# Patient Record
Sex: FEMALE | Race: WHITE | NOT HISPANIC OR LATINO | Employment: FULL TIME | URBAN - METROPOLITAN AREA
[De-identification: names, ages, dates, MRNs, and addresses within clinical notes are randomized per-mention and may not be internally consistent; named-entity substitution may affect disease eponyms.]

---

## 2018-05-16 ENCOUNTER — TELEPHONE (OUTPATIENT)
Dept: BARIATRICS | Facility: CLINIC | Age: 58
End: 2018-05-16

## 2018-05-17 ENCOUNTER — INITIAL CONSULT (OUTPATIENT)
Dept: BARIATRICS | Facility: CLINIC | Age: 58
End: 2018-05-17
Payer: COMMERCIAL

## 2018-05-17 ENCOUNTER — HOSPITAL ENCOUNTER (OUTPATIENT)
Dept: CARDIOLOGY | Facility: CLINIC | Age: 58
Discharge: HOME | End: 2018-05-17
Payer: COMMERCIAL

## 2018-05-17 ENCOUNTER — HOSPITAL ENCOUNTER (OUTPATIENT)
Dept: RADIOLOGY | Facility: HOSPITAL | Age: 58
Discharge: HOME | End: 2018-05-17
Attending: NURSE PRACTITIONER
Payer: COMMERCIAL

## 2018-05-17 ENCOUNTER — TELEPHONE (OUTPATIENT)
Dept: BARIATRICS | Facility: CLINIC | Age: 58
End: 2018-05-17

## 2018-05-17 ENCOUNTER — CLINICAL SUPPORT (OUTPATIENT)
Dept: BARIATRICS | Facility: CLINIC | Age: 58
End: 2018-05-17
Payer: COMMERCIAL

## 2018-05-17 VITALS
WEIGHT: 293 LBS | SYSTOLIC BLOOD PRESSURE: 122 MMHG | DIASTOLIC BLOOD PRESSURE: 78 MMHG | HEART RATE: 64 BPM | HEIGHT: 62 IN | BODY MASS INDEX: 53.92 KG/M2

## 2018-05-17 VITALS — BODY MASS INDEX: 53.92 KG/M2 | WEIGHT: 293 LBS | HEIGHT: 62 IN

## 2018-05-17 DIAGNOSIS — I10 HYPERTENSION, UNSPECIFIED TYPE: ICD-10-CM

## 2018-05-17 DIAGNOSIS — E66.01 MORBID OBESITY DUE TO EXCESS CALORIES: Primary | ICD-10-CM

## 2018-05-17 DIAGNOSIS — E66.01 MORBID OBESITY WITH BMI OF 60.0-69.9, ADULT: ICD-10-CM

## 2018-05-17 DIAGNOSIS — M19.90 ARTHRITIS: ICD-10-CM

## 2018-05-17 DIAGNOSIS — E66.01 MORBID OBESITY DUE TO EXCESS CALORIES: ICD-10-CM

## 2018-05-17 DIAGNOSIS — E66.9 OBESITY, UNSPECIFIED CLASSIFICATION, UNSPECIFIED OBESITY TYPE, UNSPECIFIED WHETHER SERIOUS COMORBIDITY PRESENT: ICD-10-CM

## 2018-05-17 DIAGNOSIS — R29.818 SUSPECTED SLEEP APNEA: ICD-10-CM

## 2018-05-17 DIAGNOSIS — E03.9 HYPOTHYROIDISM, UNSPECIFIED TYPE: ICD-10-CM

## 2018-05-17 PROCEDURE — 93000 ELECTROCARDIOGRAM COMPLETE: CPT | Mod: S$GLB,,, | Performed by: INTERNAL MEDICINE

## 2018-05-17 PROCEDURE — 99499 UNLISTED E&M SERVICE: CPT | Mod: S$GLB,,, | Performed by: DIETITIAN, REGISTERED

## 2018-05-17 PROCEDURE — 99999 PR PBB SHADOW E&M-EST. PATIENT-LVL II: CPT | Mod: PBBFAC,,, | Performed by: DIETITIAN, REGISTERED

## 2018-05-17 PROCEDURE — 3008F BODY MASS INDEX DOCD: CPT | Mod: CPTII,S$GLB,, | Performed by: NURSE PRACTITIONER

## 2018-05-17 PROCEDURE — 99999 PR PBB SHADOW E&M-EST. PATIENT-LVL V: CPT | Mod: PBBFAC,,, | Performed by: NURSE PRACTITIONER

## 2018-05-17 PROCEDURE — 3074F SYST BP LT 130 MM HG: CPT | Mod: CPTII,S$GLB,, | Performed by: NURSE PRACTITIONER

## 2018-05-17 PROCEDURE — 71046 X-RAY EXAM CHEST 2 VIEWS: CPT | Mod: TC,FY

## 2018-05-17 PROCEDURE — 3078F DIAST BP <80 MM HG: CPT | Mod: CPTII,S$GLB,, | Performed by: NURSE PRACTITIONER

## 2018-05-17 PROCEDURE — 99205 OFFICE O/P NEW HI 60 MIN: CPT | Mod: SA,S$GLB,, | Performed by: NURSE PRACTITIONER

## 2018-05-17 PROCEDURE — 71046 X-RAY EXAM CHEST 2 VIEWS: CPT | Mod: 26,,, | Performed by: RADIOLOGY

## 2018-05-17 RX ORDER — IBUPROFEN 200 MG
400 TABLET ORAL EVERY 6 HOURS PRN
COMMUNITY

## 2018-05-17 RX ORDER — LEVOTHYROXINE SODIUM 100 UG/1
TABLET ORAL
Refills: 3 | COMMUNITY
Start: 2018-03-02 | End: 2018-05-17

## 2018-05-17 RX ORDER — LEVOTHYROXINE SODIUM 125 UG/1
TABLET ORAL
Refills: 3 | COMMUNITY
Start: 2018-03-14

## 2018-05-17 RX ORDER — LOSARTAN POTASSIUM 50 MG/1
TABLET ORAL
Refills: 3 | COMMUNITY
Start: 2018-03-02 | End: 2018-05-17

## 2018-05-17 RX ORDER — TRIAMTERENE AND HYDROCHLOROTHIAZIDE 37.5; 25 MG/1; MG/1
CAPSULE ORAL
Refills: 3 | COMMUNITY
Start: 2018-03-23

## 2018-05-17 RX ORDER — ASPIRIN 81 MG/1
81 TABLET ORAL DAILY
COMMUNITY

## 2018-05-17 NOTE — TELEPHONE ENCOUNTER
----- Message from Jorge Laboy sent at 5/17/2018  3:49 PM CDT -----  Slim Story said she was scheduled for sleep study for tomorrow. Pt is asking to reschedule for two week due to her  not being able to go with her. Please call pt regarding this at [phone]

## 2018-05-17 NOTE — TELEPHONE ENCOUNTER
Patient called to reschedule appointment. Patient understands date, time and location of future appointments.

## 2018-05-17 NOTE — PATIENT INSTRUCTIONS
1200- 1500 calorie Sample meal plan  80-120g protein per day.   Protein drinks and bars: 0-4 grams sugar  Drink lots of water throughout the day and exercise!  MENU # 1  Breakfast: 2 eggs, 1 turkey sausage kaylen, 1 apple  Snack: Atkins bar  Lunch: 2 roll-ups (sliced turkey, low-fat sliced cheese, mustard), 12 baby carrots dipped in 1 Tbsp natural peanut butter  Mid-Day Snack: ¼ cup unsalted almonds, ½ cup fruit  Dinner: 1 chicken thigh simmered in tomato sauce + 2 Tbsp mozzarella cheese, ½ cup black beans, 1/2 cup steamed carrots  Evening Snack: 1/2 cup grapes with 4 cubes low-fat cheddar cheese   ___________________________________________________  MENU # 2  Breakfast: 200 calorie protein drink  Mid-morning snack : ¼ cup unsalted nuts, medium banana  Lunch: 3oz tuna or chicken salad made with 2 tbsp light gamboa, over salad with tomatoes and cucumbers.   Snack: low-fat cheese stick  Dinner: 3oz hamburger kaylen, slice of low-fat cheese, 1 cup boiled yellow squash and zucchini.   Snack: 6oz light yogurt  _______________________________________________________  Menu #3  Breakfast: 6oz plain Greek yogurt + fruit (½ banana, ½ cup fruit - pineapple, blueberries, strawberries, peach), may add Splenda to destiny.  Lunch: Grilled  chicken breast w/ slice low-fat pepper doug cheese, 1/2 cup grilled/baked asparagus and small salad with Salad Spritzer.    Mid-Day snack: 200 calorie protein drink   Dinner: 4oz Grilled fish, ½ cup white beans, ½ cup cooked spinach  Evening Snack: fudgsicle no-sugar-added    Menu # 4  Breakfast: 1 scoop vanilla protein powder + 4oz skim milk + 4oz coffee   Snack: Pure protein bar  Lunch: 2 Lettuce tacos: 3oz seasoned ground turkey wrapped in a Kirby lettuce leaves with 1 Tbsp shredded cheese and dollop low-fat sour cream  Snack: ½ cup cottage cheese, ½ cup pineapple chunks  Dinner: Shrimp omelet: 2 eggs, ½ cup shrimp, green onions, and shredded  cheese  ______________________________________________________  Menu #5  Breakfast: 1 cup low-fat cottage cheese, ½ cup peaches (no sugar added)  Snack: 1 apple, 4 cubes cheese  Lunch: 3oz baked pork chop, 1 cup okra and tomato stew  Snack: 1/2 cup black beans + salsa + dollop light sour cream  Dinner: Caprese chicken salad: 3 oz chicken breast, 1oz fresh mozzarella, sliced tomato, 1 Tbsp olive oil, basil  Snack: sugar-free popsicle    Menu #6  Breakfast: ½ cup part-skim ricotta cheese, 2 Tbsp sugar-free strawberry preserves, sprinkle of slivered almonds  Snack: 1 orange  Lunch: 3 oz canned chicken, 1oz shredded cheddar cheese, ½  cup black beans, 2 Tbsp salsa  Snack: 200 calorie Protein drink  Dinner: 4 oz grilled salmon steak, over mixed green salad with 2 tbsp light dressing    Meal Ideas for Regular Bariatric Diet  *Recipes and products available at [email or web address].com      Breakfast: (15-20g protein)    - Egg white omelet: 2 egg whites or ½ cup Egg Beaters. (Optional proteins: cheese, shrimp, black beans, chicken, sliced turkey) (Optional veggies: tomatoes, salsa, spinach, mushrooms, onions, green peppers, or small slice avocado)     - Egg and sausage: 1 egg or ¼ cup Egg Beaters (any variety), with 1 kaylen or 2 links of Turkey sausage or Veggie breakfast sausage ([email or web address] or Jolancer)    - Crust-less breakfast quiche: To make a glass pie dish, mix 4oz part skim Ricotta, 1 cup skim milk, and 2 eggs as your base. Add protein: shredded cheese, sliced lean ham or turkey, turkey leung/sausage. Add veggies: tomato, onion, green onion, mushroom, green pepper, spinach, etc.    - Yogurt parfait: Mix 1 - 6oz container Dannon Light N Fit vanilla yogurt, with ¼ cup crushed unsalted nuts    - Cottage cheese and fruit: ½ cup part-skim cottage cheese or ricotta cheese topped with fresh fruit or sugar free preserves     - Poonam Mendoza's Vanilla Egg custard* (add 2 Tbsp instant coffee granules to make Cappuccino  Custard*)    - Hi-Protein café latte (skim milk, decaf coffee, 1 scoop protein powder). Optional to add Sugar free syrup or extract flavoring.    - Breakfast Lox: spread fat free cream cheese on slices of smoked salmon. Serve over scrambled or egg over easy (sauteed with nonstick cookspray) OR on a cucumber slice    - Eggwhich: Scramble or cook 1 large egg over easy using nonstick cookspray. Place between 2 slices of [place] leung and low fat cheese.     Lunch: (20-30g protein)    - ½ cup Black bean soup (Homemade or Progresso), with ¼ cup shredded low-fat cheese. Top with chopped tomato or fresh salsa.     - Lean deli turkey breast and low-fat sliced cheese, mustard or light gamboa to moisten, rolled up together, or wrapped in a Kirby lettuce leaf    - Chicken salad made from dinner leftovers, moisten with low-fat salad dressing or light gamboa. Also try leftover salmon, shrimp, tuna or boiled eggs. Serve ½ cup over dark green salad    - Fat-free canned refried beans, topped with ¼ cup shredded low-fat cheese. Top with chopped tomato or fresh salsa.     - Greek salad: Top mixed greens with 1-2oz grilled chicken, tomatoes, red onions, 2-3 kalamata olives, and sprinkle lightly with feta cheese. Spritz with Balsamic vinegar to taste.     - Crust-less lunch quiche: To make a glass pie dish, mix 4oz part skim Ricotta, 1 cup skim milk, and 2 eggs as your base. Add protein: shredded cheese, sliced lean ham or turkey, shrimp, chicken. Add veggies: tomato, onion, green onion, mushroom, green pepper, spinach, artichoke, broccoli, etc.    - Pizza bake: spread a  kelly les mushroom with tomato sauce, low-fat shredded mozzarella and turkey pepperoni or Warren leung. Add any veggies. Roast for 10-15 minutes, until cheese melted.     - Cucumber crab bites: Spread ¼ cup crab dip (lump crabmeat + light cream cheese and green onions) over sliced cucumber.     - Chicken with light spinach and artichoke dip*: Puree in food  processor: 6oz cooked and drained spinach, 2 cloves garlic, 1 can cannelloni beans, ½ cup chopped green onions, 1 can drained artichoke hearts (not marinated in oil), lemon juice and basil. Mix in 2oz chopped up chicken.    Supper: (20-30g protein)    - Serve grilled fish over dark green salad tossed with low-fat dressing, served with grilled asparagus cordova     - Rotisserie chicken salad: served with sliced strawberries, walnuts, fat-free feta cheese crumbles and 1 tbsp Christians Own Light Raspberry [place] Vinaigrette    - Shrimp cocktail: Dip cold boiled shrimp in homemade low-sugar cocktail sauce (1/2 cup Prasanth One Carb ketchup, 2 tbsp horseradish, 1/4 tsp hot sauce, 1 tsp Worcestershire sauce, 1 tbsp freshly-squeezed lemon juice). Serve with dark green salad, walnuts, and crumbled blue cheese drizzled with olive oil and Balsamic vinegar    - Tuna Melt: Spread tuna salad onto 2 thick slices of tomato. Top with low-fat cheese and broil until cheese is melted. May also be made with chicken salad of shrimp salad. [place] with different types of cheeses.    - Chicken or beef fajitas (no tortilla, rice, beans, chips). Top meat and veggies w/ fresh salsa, fat free sour cream.     - Homemade low-fat Chili using extra lean ground beef or ground turkey. Top with shredded cheese and salsa as desired. May add dollop fat-free sour cream if desired    - Chicken parmesan: Top chicken breast w/ low sugar marinara sauce, mozzarella cheese and bake until chicken reaches 165*.  Serve w/ spaghetti SQUASH or German cut green beans    - Dinner Omelet with shrimp or chicken and onion, green peppers and chives.    - No noodle lasagna: Use sliced zucchini or eggplant in place of noodles.  Layer with part skim ricotta cheese and low sugar meat sauce (use very lean ground beef or ground turkey).    - Mexican chicken bake: Bake chunks of chicken breast or thigh with taco seasoning, Pace brand enchilada sauce, green onions and low-fat  cheese. Serve with ¼ cup black beans or fat free refried beans topped with chopped tomatoes or salsa.    - Jeremiah frozen meatballs, simmered in Classico Marinara sauce. Different flavors of salsa or spaghetti sauce create different dishes! Sprinkle with parmesan cheese. Serve with grilled or steamed veggies, or a dark green salad.    - Simmer boneless skinless chicken thigh chunks in Classico Marinara sauce or roasted salsa until tender with chopped onion, bell pepper, garlic, mushrooms, spinach, etc.     - Hamburger or veggie burger, without the bun, dressed the way you like. Served with grilled or steamed veggies.    - Eggplant parmesan: Bake slices of eggplant at 350 degrees for 15 minutes. Layer tomato sauce, sliced eggplant and low-fat mozzarella cheese in a baking dish and cover with foil. Bake 30-40 more minutes or until bubbly. Uncover and bake at 400 degrees for about 15 more minutes, or until top is slightly crisp.    - Fish tacos: grilled/baked white fish, wrapped in Kirby lettuce leaf, topped with salsa, shredded low-fat cheese, and light coleslaw.    - Chicken terrence: Sprinkle chicken w/ 1 tsp of hidden valley ranch dip mix. Then grill chicken and top with black beans, salsa and 1 tsp fat free sour cream.     - Cauliflower pizza crust: Use cauliflower as crust (see recipe on magnolia, no flour!). Top w/ low fat cheese, turkey pepperoni and veggies and bake again    - chicken or turkey crust pizza: use ground chicken or turkey instead of cauliflower, spread in [place] and bake at 350 for about 20-30 minutes(may want to add garlic, black pepper, oregano and other herbs to ground meat mixture).  Remove and top w/ low fat cheese, turkey pepperoni and veggies and bake again for another 10 minutes or until cheese is browned.     Snacks: (100-200 calories; >5g protein)    - 1 low-fat cheese stick with 8 cherry tomatoes or 1 serving fresh fruit  - 4 thin slices fat-free turkey breast and 1 slice low-fat  cheese  - 4 thin slices fat-free honey ham with wedge of melon  - 6-8 edamame pods (equivalent to about 1/4 cup edamame without pods).   - 1/4 cup unsalted nuts with ½ cup fruit  - 6-oz container Dannon Light n Fit vanilla yogurt, topped with 1oz unsalted nuts         - apple, celery or baby carrots spread with 2 Tbsp PB2  - apple slices with 1 oz slice low-fat cheese  - Apple slices dipped in 2 Tbsp of PB2  - celery, cucumber, bell pepper or baby carrots dipped in ¼ cup hummus bean spread or light spinach and artichoke dip (*recipe in lunch section)  - celery, cucumber, baby carrots dipped in high protein greek yogurt (Mix 16 oz plain greek yogurt + 1 packet of hidden valley ranch dip mix)  - Tyler Links Beef Steak - 14g protein! (similar to beef jerky)  - 2 wedges [place] - Light Herb & Garlic Cheese with sliced cucumber or green bell pepper  - 1/2 cup low-fat cottage cheese with ¼ cup fruit or ¼ cup salsa  - RTD Protein drinks: Atkins, Low Carb Slim Fast, EAS light, Muscle Milk Light, etc.  - Homemade Protein drinks: GNC Soy95, Isopure, Nectar, UNJURY, Whey Gourmet, etc. Mix 1 scoop powder with 8oz skim/1% milk or light soymilk.  - Protein bars: Atkins, EAS, Pure Protein, Think Thin, Detour, etc. Must have 0-4 grams sugar - Read the label.    Takeout Options: No more than twice/week  Deli - Salads (no pasta or rice), meats, cheeses. Roasted chicken. Lox (salmon)    Mexican - Platters which don't include tortillas, chips, or rice. Go easy on the beans. Example: Fajitas without the tortillas. Ask the  not to bring chips to the table if they are too tempting.    Greek - Meat or fish and vegetable, but no bread or rice. Including hummus, baba ganoush, etc, is OK. Most sit-down Greek restaurants can provide you with cucumber slices for dipping instead of annie bread.    Fast Food (Avoid as much as possible) - Salads (no croutons and limit salad dressing to 2 tbsp), grilled chicken sandwich without the bun  and ask for no gamboa. Evs low fat chili or Taco Bell pintos and cheese.    BBQ - The meats are fine if you ask for sauces on the side, but most of the traditional side dishes are loaded with carbs. Cale slaw, baked beans and BBQ sauce are typically made with sugar.    Chinese - Nothing deep-fried, no rice or noodles. Many Chinese sauces have starch and sugar in them, so you'll have to use your judgement. If you find that these sauces trigger cravings, or cause Dumping, you can ask for the sauce to be made without sugar or just use soy sauce.

## 2018-05-17 NOTE — PROGRESS NOTES
BARIATRIC NEW PATIENT EVALUATION    CHIEF COMPLAINT:   Morbid Obesity Body mass index is 66.37 kg/m². and inability to lose weight.    HISTORY OF PRESENT ILLNESS:  Celena Ford  is a 57 y.o. female presenting for morbid obesity Body mass index is 66.37 kg/m². and inability to lose weight. Long history of drinking Cokes, eating sweets, fast foods. Wt loss attempts: the grapefruit diet and walking 3 miles a day; was able to lose 40#. She has not tried diet pills. The past few months she has been practicing portion control and increasing protein to lose weight on her own. Her highest wt at 364. Wt difficulty began in childhood,    PAST MEDICAL HISTORY:  Past Medical History:   Diagnosis Date    BMI 60.0-69.9, adult     Hypertension     Obesity     Thyroid disease      PAST SURGICAL HISTORY:  Past Surgical History:   Procedure Laterality Date     SECTION      x3    CHOLECYSTECTOMY      HYSTERECTOMY      KNEE ARTHROSCOPY W/ MENISCAL REPAIR Left     TOTAL KNEE ARTHROPLASTY Right     TOTAL WRIST ARTHROPLASTY Right      FAMILY HISTORY:  Family History   Problem Relation Age of Onset    Emphysema Mother     Heart attack Father      SOCIAL HISTORY:  Social History     Social History    Marital status:      Spouse name: N/A    Number of children: N/A    Years of education: N/A     Occupational History    Not on file.     Social History Main Topics    Smoking status: Never Smoker    Smokeless tobacco: Never Used    Alcohol use No    Drug use: No    Sexual activity: Not on file     Other Topics Concern    Not on file     Social History Narrative    Works nights at [employer]. Work 8 days - 10 hours, then off 6 days.     MEDICATIONS:  No current outpatient prescriptions on file.     No current facility-administered medications for this visit.      ALLERGIES:  Review of patient's allergies indicates:  Allergies not on file    ROS:  Review of Systems   Constitutional: Negative for  "chills, fever, malaise/fatigue and weight loss.   Respiratory: Negative for cough, shortness of breath and wheezing.         + activity intolerance   Cardiovascular: Positive for palpitations (palpitations monthly at rest). Negative for chest pain.   Gastrointestinal: Negative for abdominal pain, blood in stool, constipation, diarrhea, heartburn, melena, nausea and vomiting.   Genitourinary: Positive for frequency (long standing). Negative for dysuria and urgency.   Musculoskeletal: Positive for back pain and joint pain. Negative for myalgias and neck pain.   Skin: Negative for itching and rash.   Neurological: Positive for tingling (hands). Negative for dizziness and headaches.   Psychiatric/Behavioral: Negative for depression. The patient is not nervous/anxious.      PE:  Vitals:    05/17/18 1325   BP: 122/78   Pulse: 64   Weight: (!) 164.6 kg (362 lb 14 oz)   Height: 5' 2" (1.575 m)       Physical Exam     DIAGNOSIS:  1. Morbid Obesity with Body mass index is 66.37 kg/m². and inability to lose weight.  2. Co-morbidities: HTN, hypothyroid, suspected sleep apnea    PLAN:  The patient is a potential candidate for Bariatric Surgery. she is interested in sleeve gastrectomy with Dr. Sotomayor. The surgery and post-op care were discussed in detail with the patient. All questions were answered.    she understands that bariatric surgery is a tool to aid in weight loss and that she needs to be committed to the diet and exercise post-operatively for successful weight loss.  Discussed expected weight loss outcomes after surgery which is 50% of the excess weight on her frame.  Goal weight is 247#s.  Discussed with patient that bariatric surgery is not the easy way out and that it will take plenty of dedication on the patient's part to be successful. Also discussed the possibility of weight regain if the patient strays from the diet guidelines or exercise requirements. Patient verbalized understanding and wishes to proceed " with the work-up.    ORDERS:  1. Chest X-Ray, EKG and IVC Filter  2. Psychological Consult, Cardiac Clearance, PCP Clearance and Cardiology clearance (external), Sleep Medicine  3. Bariatric Labs: BMP, CBC, Folate Serum, H. pylori, HgA1C, Hepatic Panel/LFT, Iron & TIBC, Lipid Profile, Magnesium, Phosphate, T3, T4, TSH, Free T4, Vitamin B12, and Vitamin B1.  4. 6 month MSD per insurance.  5. No NSAIDS after surgery due to increased risk of stomach ulcers. Talk to your prescribing provider about alternative options for your pain.  6. Encourage wt loss prior to surgery. Will need IVC filter for BMI > 60    Primary Physician: Jeannette Ely MD  RTC: As scheduled.    60 minute visit, over 50% of time spent counseling patient face to face on surgical options, risks, benefits, expected diet, recommended exercise regimen, and expected weight loss.

## 2018-05-17 NOTE — PROGRESS NOTES
"NUTRITIONAL CONSULT    Referring Physician: Austin Sotomayor M.D.  Reason for MNT Referral: Initial assessment for sleeve gastrectomy work-up    57 y.o. female presents with a BMI of Body mass index is 66.37 kg/m².  Weight history includes struggling with weight all her life.  Dieting attempts include losing 40 lbs walking 3 miles a day and "grapefruit diet". No diet pills (Metabolife) - afraid it would make heart race. Long history of drinking Cokes, eating sweets, fast foods.    The past few months she has been cutting back to try to lose weight on her own. Eating Lean Cuisines at work. Snacks are fruit, yogurt, Premier protein shakes and a variety of protein bars.     Works nights at [employer]. Work 8 days - 10 hours, then off 6 days.    CLINICAL DATA:  57 y.o.-year-old White female.  Height: 5 ft. 2 in.  Weight: 362 lbs  IBW: 132 lbs  BMI: 66.3    Goal for Bariatric Surgery: to improve health, to improve quality of life, to lose weight and to prevent future medical conditions    NUTRITION & HEALTH HISTORY:  Greatest challenge: dining out frequency, sweets, starchy CHO, portion control, snacking at night, irregular meal patterns, emotional eating and high-fat diet    Current diet recall:     7:30am off work: Evita 2 sausage egg and cheese biscuits OR salad with honey bbq wings  12pm sleep  7:30pm wake up  8pm: PB toast Or Premier shake Or protein bar  9:15pm work  Sips on 32oz jug of sweet tea all night  2am: Lean Cuisine  5am: yogurt, fruit      Exercise:  Past exercise: Adequate. Walking 3 miles per day    Current exercise: None  Restrictions to exercise: bad knees    Vitamins / Minerals / Herbs:   none    Food Allergies:   None known  Mild lactose intolerance    Social:  Works day and/or night shifts. Jose  Lives with her .  Grocery shopping and food prep done by both.  Patient believes the household will be supportive after surgery.  Alcohol: None.  Smoking: None.    ASSESSMENT:  · Patient " reports attempts at weight loss, only to regain lost weight.  · Patient demonstrated knowledge of healthy eating behaviors and exercise patterns; admits to not eating healthy and not exercising at this point.  · Patient states willingness to change lifestyle and make behavior modifications.  · Expect fair  compliance after surgery at this time.    Insurance requires 6 month medically supervised diet prior to consideration for bariatric surgery.    BARIATRIC DIET DISCUSSION:  Discussed diet after surgery and related to patients food record.  Reviewed diet progression before and after surgery.  Reinforced that surgery is not a magic bullet and importance of low fat foods and no snacking.  Stressed importance of exercise and its role in achieving weight loss goals.  Answered all questions.    RECOMMENDATIONS:  Patient is a potential candidate for bariatric surgery - Sleeve.    Needs 5 additional visit(s) with RD for MSD.    PLAN:  Eliminate sugary drinks  Easy meal ideas: Greek yogurt, P3, tuna salad, omelets, Atkins frozen dinners    Continue to review Bariatric Nutrition Guidebook at home and call with any questions.  Work on Bariatric Nutrition Checklist.  Work on expanding variety of vegetables.  Work on gradually cutting back on starchy CHO in the diet.  Reduce frequency of dining out.  More grocery shopping and meal preparation at home.     Weight loss goal prior to surgery: 35 lbs for BMI under 60    SESSION TIME:  30 minutes

## 2018-05-17 NOTE — PATIENT INSTRUCTIONS
Prior to surgery you will need to complete:  - Dietitian consult and follow up appointments as needed  - PCP, cardiology clearance  - Labs  - Chest X-ray  - EKG  - Psychological evaluation, Please call psychiatry [phone] to schedule  - Sleep Medicine    In preparation for bariatric surgery, please complete the following:   · Discuss your current medications with your primary care provider, remember your medications will need to be crushed, chewable, or in liquid form for the first 3-6 months after a gastric bypass or sleeve.  For a gastric band, your medications will need to be crushed indefinitely.    · If you take a blood thinner such as: Coumadin (warfarin), Pradaxa (dabigatran), or Plavix (clopidogrel), you will need to speak with your prescribing provider on how or if this medication can be stopped before surgery.   · If you take a medication for depression or anxiety, you will need to begin crushing or opening the capsule 1-3 months prior to surgery.  Remember to discuss this with the psychologist or psychiatrist that you see.   · If you take medication for arthritis on a daily basis that is considered a non-steroidal anti-inflammatory (NSAID), please discuss with your prescribing physician an alternative medication.  After having gastric bypass or gastric sleeve, this group of medications is not appropriate to take due to increased risk of bleeding stomach ulcers.      DEFINITIONS  Appointments: Pre-scheduled meetings or consultations with any physician, advanced practice provider, dietitian, or psychologist, and labs, imaging studies, sleep studies, etc.   Late cancellation: Cancelling an appointment 24-48 hours prior to scheduled time.  No-Show appointment:  is when    You do NOT arrive to your appointment at the time its scheduled.   You call to cancel or cancel via [facility] less than 24 hours in advance of your scheduled appointment   You show up 15 minutes AFTER your scheduled appointment time  without any notification of being late.     POLICY  1. You are allowed up to 3 cancellations for appointments.    After 3 cancellations your case will be placed on hold for 2 months and after that time you can resume the program.   2. You are allowed only 1 no-show for an appointment.    You will be re-scheduled one time and if there is a 2nd no-show at any point, your case will be placed on hold for 3 months.  After 3 months you can resume the program.     3. Upon resuming the program after being placed on hold for either above mentioned reasons, if you have a late cancel or no show for any appointment, the bariatric team will review if youre an appropriate candidate for surgery at the monthly meeting.

## 2018-05-17 NOTE — LETTER
[facility]  [address]  [place] [address]  Phone: [phone]  Fax: [phone] May 17, 2018      Jeannette Ely MD  [address] [place] [address]    Patient: Celena Ford   MR Number: [id number]   YOB: 1960   Date of Visit: 5/17/2018     Dear Dr. Ely:    Thank you for referring Celena Ford to me for evaluation. Below are the relevant portions of my assessment and plan of care.    BARIATRIC NEW PATIENT EVALUATION     CHIEF COMPLAINT:   Morbid Obesity Body mass index is 66.37 kg/m². and inability to lose weight.     HISTORY OF PRESENT ILLNESS:  Celena Ford  is a 57-year-old female presenting for morbid obesity Body mass index is 66.37 kg/m². and inability to lose weight. Long history of drinking Cokes, eating sweets, fast foods. Weight loss attempts: the grapefruit diet and walking 3 miles a day; was able to lose 40#. She has not tried diet pills. The past few months she has been practicing portion control and increasing protein to lose weight on her own. Her highest wt at 364. Wt difficulty began in childhood,    DIAGNOSIS:  1. Morbid Obesity with Body mass index is 66.37 kg/m². and inability to lose weight.  2. Co-morbidities: HTN, hypothyroid, suspected sleep apnea     PLAN:The patient is a potential candidate for Bariatric Surgery. she is interested in sleeve gastrectomy with Dr. Sotomayor. The surgery and post-op care were discussed in detail with the patient. All questions were answered.     She understands that Bariatric Surgery is a tool to aid in weight loss and that she needs to be committed to the diet and exercise post-operatively for successful weight loss.  Discussed expected weight loss outcomes after surgery which is 50% of the excess weight on her frame.  Goal weight is 247#s.  Discussed with patient that bariatric surgery is not the easy way out and that it will take plenty of dedication on the patient's part to  be successful. Also discussed the possibility of weight regain if the patient strays from the diet guidelines or exercise requirements. Patient verbalized understanding and wishes to proceed with the work-up.  ORDERS:  1. Chest X-Ray, EKG and IVC Filter  2. Psychological Consult, Cardiac Clearance, PCP Clearance and Cardiology clearance (external), Sleep Medicine  3. Bariatric Labs: BMP, CBC, Folate Serum, H. pylori, HgA1C, Hepatic Panel/LFT, Iron & TIBC, Lipid Profile, Magnesium, Phosphate, T3, T4, TSH, Free T4, Vitamin B12, and Vitamin B1.  4. 6 month MSD per insurance.  5. No NSAID's after surgery due to increased risk of stomach ulcers. Talk to your prescribing provider about alternative options for your pain.  6. Encourage wt loss prior to surgery. Will need IVC filter for BMI > 60    If you have questions, please do not hesitate to call me. I look forward to following Celena along with you.    Sincerely,    DIVYA Richard   Section of Bariatric Surgery  [facility]    TIGIST/samuel

## 2018-05-21 ENCOUNTER — TELEPHONE (OUTPATIENT)
Dept: BARIATRICS | Facility: CLINIC | Age: 58
End: 2018-05-21

## 2018-05-21 DIAGNOSIS — R94.31 ABNORMAL EKG: Primary | ICD-10-CM

## 2018-05-23 ENCOUNTER — TELEPHONE (OUTPATIENT)
Dept: BARIATRICS | Facility: CLINIC | Age: 58
End: 2018-05-23

## 2018-05-23 NOTE — TELEPHONE ENCOUNTER
dwp abnormal ekg, needs cardiologist per NP. Pt has a cardiologist @ [place]. Will schedule an appt with them. Informed pt to get the fax # and I can fax over the ekg report. Pt will c/b

## 2018-05-23 NOTE — TELEPHONE ENCOUNTER
----- Message from DIVYA Richard sent at 5/21/2018  5:08 PM CDT -----  Abnormal EKG. Please set up to see cardiology. Referral ordered. Thanks!

## 2018-06-05 ENCOUNTER — TELEPHONE (OUTPATIENT)
Dept: BARIATRICS | Facility: CLINIC | Age: 58
End: 2018-06-05

## 2018-06-05 NOTE — TELEPHONE ENCOUNTER
----- Message from DIVYA Richard sent at 6/4/2018 10:44 AM CDT -----      ----- Message -----  From: Erasmo Torres MA  Sent: 6/4/2018   9:57 AM  To: DIVYA Richard    Tried calling Patient to scheduled No answer/Left voicemail can we advise Patient to call in and schedule [phone]  ----- Message -----  From: DIVYA Richard  Sent: 5/17/2018   2:06 PM  To: Erasmo Torres MA    Please call to schedule bariatric psych eval.    Thanks,  Miriam

## 2018-06-05 NOTE — TELEPHONE ENCOUNTER
Called patient to instruct her to give psych a call to schedule her eval. Provided phone number as well. Patient expressed understanding.

## 2018-06-17 ENCOUNTER — PATIENT MESSAGE (OUTPATIENT)
Dept: BARIATRICS | Facility: CLINIC | Age: 58
End: 2018-06-17

## 2018-06-25 ENCOUNTER — CLINICAL SUPPORT (OUTPATIENT)
Dept: BARIATRICS | Facility: CLINIC | Age: 58
End: 2018-06-25
Payer: COMMERCIAL

## 2018-06-25 ENCOUNTER — LAB VISIT (OUTPATIENT)
Dept: LAB | Facility: HOSPITAL | Age: 58
End: 2018-06-25
Payer: COMMERCIAL

## 2018-06-25 ENCOUNTER — TELEPHONE (OUTPATIENT)
Dept: BARIATRICS | Facility: CLINIC | Age: 58
End: 2018-06-25

## 2018-06-25 VITALS — BODY MASS INDEX: 61.73 KG/M2 | WEIGHT: 293 LBS

## 2018-06-25 DIAGNOSIS — E87.6 HYPOKALEMIA: Primary | ICD-10-CM

## 2018-06-25 DIAGNOSIS — Z71.3 DIETARY COUNSELING: ICD-10-CM

## 2018-06-25 DIAGNOSIS — E66.01 MORBID OBESITY WITH BMI OF 60.0-69.9, ADULT: ICD-10-CM

## 2018-06-25 DIAGNOSIS — E66.01 MORBID OBESITY DUE TO EXCESS CALORIES: ICD-10-CM

## 2018-06-25 DIAGNOSIS — I10 HYPERTENSION, UNSPECIFIED TYPE: ICD-10-CM

## 2018-06-25 DIAGNOSIS — E55.9 VITAMIN D DEFICIENCY: ICD-10-CM

## 2018-06-25 LAB
25(OH)D3+25(OH)D2 SERPL-MCNC: 12 NG/ML
ALBUMIN SERPL BCP-MCNC: 3.9 G/DL
ALP SERPL-CCNC: 89 U/L
ALT SERPL W/O P-5'-P-CCNC: 21 U/L
ANION GAP SERPL CALC-SCNC: 14 MMOL/L
AST SERPL-CCNC: 17 U/L
BASOPHILS # BLD AUTO: 0.08 K/UL
BASOPHILS NFR BLD: 0.7 %
BILIRUB DIRECT SERPL-MCNC: 0.3 MG/DL
BILIRUB SERPL-MCNC: 0.5 MG/DL
BUN SERPL-MCNC: 23 MG/DL
CALCIUM SERPL-MCNC: 10.2 MG/DL
CHLORIDE SERPL-SCNC: 100 MMOL/L
CHOLEST SERPL-MCNC: 186 MG/DL
CHOLEST/HDLC SERPL: 3.7 {RATIO}
CO2 SERPL-SCNC: 28 MMOL/L
CREAT SERPL-MCNC: 0.8 MG/DL
DIFFERENTIAL METHOD: ABNORMAL
EOSINOPHIL # BLD AUTO: 0.2 K/UL
EOSINOPHIL NFR BLD: 1.9 %
ERYTHROCYTE [DISTWIDTH] IN BLOOD BY AUTOMATED COUNT: 14.6 %
EST. GFR  (AFRICAN AMERICAN): >60 ML/MIN/1.73 M^2
EST. GFR  (NON AFRICAN AMERICAN): >60 ML/MIN/1.73 M^2
ESTIMATED AVG GLUCOSE: 111 MG/DL
FOLATE SERPL-MCNC: 9.5 NG/ML
GLUCOSE SERPL-MCNC: 90 MG/DL
HBA1C MFR BLD HPLC: 5.5 %
HCT VFR BLD AUTO: 44.9 %
HDLC SERPL-MCNC: 50 MG/DL
HDLC SERPL: 26.9 %
HGB BLD-MCNC: 14.5 G/DL
IMM GRANULOCYTES # BLD AUTO: 0.04 K/UL
IMM GRANULOCYTES NFR BLD AUTO: 0.3 %
IRON SERPL-MCNC: 34 UG/DL
LDLC SERPL CALC-MCNC: 118.6 MG/DL
LYMPHOCYTES # BLD AUTO: 1.6 K/UL
LYMPHOCYTES NFR BLD: 14.2 %
MAGNESIUM SERPL-MCNC: 1.9 MG/DL
MCH RBC QN AUTO: 26.2 PG
MCHC RBC AUTO-ENTMCNC: 32.3 G/DL
MCV RBC AUTO: 81 FL
MONOCYTES # BLD AUTO: 0.7 K/UL
MONOCYTES NFR BLD: 6.3 %
NEUTROPHILS # BLD AUTO: 8.8 K/UL
NEUTROPHILS NFR BLD: 76.6 %
NONHDLC SERPL-MCNC: 136 MG/DL
NRBC BLD-RTO: 0 /100 WBC
PHOSPHATE SERPL-MCNC: 2.8 MG/DL
PLATELET # BLD AUTO: 268 K/UL
PMV BLD AUTO: 10.4 FL
POTASSIUM SERPL-SCNC: 3.1 MMOL/L
PROT SERPL-MCNC: 7.4 G/DL
RBC # BLD AUTO: 5.53 M/UL
SATURATED IRON: 8 %
SODIUM SERPL-SCNC: 142 MMOL/L
T3 SERPL-MCNC: 86 NG/DL
T4 FREE SERPL-MCNC: 1.44 NG/DL
T4 SERPL-MCNC: 10.6 UG/DL
TOTAL IRON BINDING CAPACITY: 407 UG/DL
TRANSFERRIN SERPL-MCNC: 275 MG/DL
TRIGL SERPL-MCNC: 87 MG/DL
TSH SERPL DL<=0.005 MIU/L-ACNC: 0.75 UIU/ML
VIT B12 SERPL-MCNC: 543 PG/ML
WBC # BLD AUTO: 11.51 K/UL

## 2018-06-25 PROCEDURE — 84443 ASSAY THYROID STIM HORMONE: CPT

## 2018-06-25 PROCEDURE — 84436 ASSAY OF TOTAL THYROXINE: CPT

## 2018-06-25 PROCEDURE — 84439 ASSAY OF FREE THYROXINE: CPT

## 2018-06-25 PROCEDURE — 80048 BASIC METABOLIC PNL TOTAL CA: CPT

## 2018-06-25 PROCEDURE — 83735 ASSAY OF MAGNESIUM: CPT

## 2018-06-25 PROCEDURE — 82607 VITAMIN B-12: CPT

## 2018-06-25 PROCEDURE — 85025 COMPLETE CBC W/AUTO DIFF WBC: CPT

## 2018-06-25 PROCEDURE — 80076 HEPATIC FUNCTION PANEL: CPT

## 2018-06-25 PROCEDURE — 82306 VITAMIN D 25 HYDROXY: CPT

## 2018-06-25 PROCEDURE — 82746 ASSAY OF FOLIC ACID SERUM: CPT

## 2018-06-25 PROCEDURE — 84100 ASSAY OF PHOSPHORUS: CPT

## 2018-06-25 PROCEDURE — 86677 HELICOBACTER PYLORI ANTIBODY: CPT

## 2018-06-25 PROCEDURE — 83036 HEMOGLOBIN GLYCOSYLATED A1C: CPT

## 2018-06-25 PROCEDURE — 97803 MED NUTRITION INDIV SUBSEQ: CPT | Mod: S$GLB,,, | Performed by: DIETITIAN, REGISTERED

## 2018-06-25 PROCEDURE — 36415 COLL VENOUS BLD VENIPUNCTURE: CPT

## 2018-06-25 PROCEDURE — 84480 ASSAY TRIIODOTHYRONINE (T3): CPT

## 2018-06-25 PROCEDURE — 99499 UNLISTED E&M SERVICE: CPT | Mod: S$GLB,,, | Performed by: DIETITIAN, REGISTERED

## 2018-06-25 PROCEDURE — 80061 LIPID PANEL: CPT

## 2018-06-25 PROCEDURE — 83540 ASSAY OF IRON: CPT

## 2018-06-25 PROCEDURE — 84425 ASSAY OF VITAMIN B-1: CPT

## 2018-06-25 PROCEDURE — 99999 PR PBB SHADOW E&M-EST. PATIENT-LVL II: CPT | Mod: PBBFAC,,, | Performed by: DIETITIAN, REGISTERED

## 2018-06-25 NOTE — PATIENT INSTRUCTIONS
PLAN:    Diet: Adjust diet plan.  - low carb choices when dining out  - no wings, or at least with no skin/sugary sauce  - continue to increase variety vegetables - no corn  - try lactose free prot shakes, list provided    *Lactose Free products: Muscle Milk Light, [employer] Total Lean 25 (RTD and powder), Isopure zero carb (RTD and powder), Unjury PLANTED (powder), Vamshi Colton egg white (powder), Nectar (powder), Protein 2 O (RTD)

## 2018-06-25 NOTE — PROGRESS NOTES
NUTRITION NOTE    Referring Physician: Austin Sotomayor M.D.  Reason for MNT Referral: 6 month Medically Supervised Diet pending Gastric Sleeve    Patient presents for 2nd visit for MSD with 25 lbs weight loss over the past month; total weight loss 25 lbs by making numerous dietary and lifestyle changes. Still struggling with convenience meals d/t fatigue after work. Great support from her . Tried Premier but has GI issues d/t lactose int.    Past Medical History:   Diagnosis Date    BMI 60.0-69.9, adult     Hypertension     Obesity     Thyroid disease        CLINICAL DATA:  57 y.o. female.    Current Weight: 337 lbs  Weight Change Since Initial Visit: - 25 lbs  BMI: 61.7 (from 66.3)    CURRENT DIET:  Reduced-calorie diet.  Diet Recall: Food records are present.    Works 9pm-7am. 1 week off, 1 week on.    Week off work diet recall:    B: scrambled eggs  Sn: pistachios  L: leftover [employer] 1/2 grilled chicken parmesan with pasta   Sn: Premier shake  D: red beans and sausage w/o rice    Week on work diet recall:    B: greek yogurt  L: Atkins frozen dinner  Sn: 2 halo oranges  D: honey bbq chicken wings - frozen meal    Diet Includes:   Meal Pattern: Improved. 3 meals + 1-3 snacks  Protein Supplements: Occ Premier shakes - GI issues d/t lactose int  Snacking: Adequate. Snacks include healthy choices. Nuts, greek yogurt, cheese sticks, rolled deli turkey strawberries/grapes/halo oranges, Doritos 1 small bag  Vegetables: Likes a few. Eats 2-3 times per week.  Fruits: Likes a variety. Eats almost daily.  Beverages: water and diet tea with Stevia  Dining Out: Dining out: Weekly. Mostly fast food, restaurants and take-out. [place] [employer] - grilled chicken parm with pasta, 1 breadstick, salad. [employer] leung sandwich and fries.  Cooking at home: Almost Daily. Mostly baked, grilled and smothered meat and vegetables.    CURRENT EXERCISE:  Fair. Stationary bike a few times/week. Only on week's off.  Restrictions to  exercise: knee pain    Vitamins / Minerals / Herbs:   none     Food Allergies:   None known  Mild lactose intolerance     Social:  Works day and/or night shifts. [employer]  Lives with her .  Grocery shopping and food prep done by both.  Patient believes the household will be supportive after surgery.  Alcohol: None.  Smoking: None.    ASSESSMENT:  Patient demonstrates some willingness to change lifestyle habits as evidenced by weight loss, daily food logs, dietary changes and including protein drinks.    Doing fairly well with working on greatest challenges (lack of vegetables, convenience meals).    Needs 4 additional visits with RD for MSD.    PLAN:    Diet: Adjust diet plan.  - low carb choices when dining out  - no wings, or at least with no skin/sugary sauce  - continue to increase variety vegetables - no corn  - try lactose free prot shakes, list provided    Exercise: as kiley.    Behavior Modification: Continue to document food & activity logs daily.    Weight loss prior to surgery: 35 lbs for BMI under 60    Return to clinic in one month.    SESSION TIME:  30 minutes

## 2018-06-25 NOTE — TELEPHONE ENCOUNTER
Please call pt.     Vitamin D is low. Please identify her preferred pharmacy and call in ergocalciferol.    She is to take 1 capsule 2x/week for 12 weeks.   Notify us when all 24 capsules are complete and we will repeat lab.     Iron is also low. Please have her take a multivitamin with iron daily.     Potassium is low. Please identify her preferred pharmacy and route to me. I will prescribe 1 tablet twice daily for three days. Please schedule her for recheck Friday (3 day after starting supplementation).

## 2018-06-27 LAB
H PYLORI IGG SERPL QL IA: NEGATIVE
VIT B1 SERPL-MCNC: 77 UG/L (ref 38–122)

## 2018-06-28 ENCOUNTER — TELEPHONE (OUTPATIENT)
Dept: BARIATRICS | Facility: CLINIC | Age: 58
End: 2018-06-28

## 2018-06-28 DIAGNOSIS — E55.9 VITAMIN D DEFICIENCY: ICD-10-CM

## 2018-06-28 DIAGNOSIS — E87.6 HYPOKALEMIA: ICD-10-CM

## 2018-06-28 RX ORDER — POTASSIUM CHLORIDE 1.5 G/1.58G
20 POWDER, FOR SOLUTION ORAL 2 TIMES DAILY
Qty: 6 PACKET | Refills: 0 | Status: SHIPPED | OUTPATIENT
Start: 2018-06-28 | End: 2018-07-01

## 2018-06-28 RX ORDER — ERGOCALCIFEROL 1.25 MG/1
50000 CAPSULE ORAL
Qty: 24 CAPSULE | Refills: 0 | Status: SHIPPED | OUTPATIENT
Start: 2018-06-28

## 2018-06-28 NOTE — TELEPHONE ENCOUNTER
Called patient to obtain pharmacy and give instructions for low vitamin d, iron, and potassium. Patient understands instructions, date, time and location of appointments.

## 2018-06-29 RX ORDER — ERGOCALCIFEROL 1.25 MG/1
CAPSULE ORAL
Qty: 26 CAPSULE | Refills: 0 | OUTPATIENT
Start: 2018-06-29

## 2018-06-29 RX ORDER — POTASSIUM CHLORIDE 1.5 G/1
POWDER, FOR SOLUTION ORAL
Refills: 0 | OUTPATIENT
Start: 2018-06-29

## 2018-07-23 ENCOUNTER — CLINICAL SUPPORT (OUTPATIENT)
Dept: BARIATRICS | Facility: CLINIC | Age: 58
End: 2018-07-23
Payer: COMMERCIAL

## 2018-07-23 VITALS — WEIGHT: 293 LBS | HEIGHT: 62 IN | BODY MASS INDEX: 53.92 KG/M2

## 2018-07-23 DIAGNOSIS — Z71.3 DIETARY COUNSELING: ICD-10-CM

## 2018-07-23 PROCEDURE — 99499 UNLISTED E&M SERVICE: CPT | Mod: S$GLB,,, | Performed by: DIETITIAN, REGISTERED

## 2018-07-23 PROCEDURE — 97803 MED NUTRITION INDIV SUBSEQ: CPT | Mod: S$GLB,,, | Performed by: DIETITIAN, REGISTERED

## 2018-07-23 PROCEDURE — 99999 PR PBB SHADOW E&M-EST. PATIENT-LVL II: CPT | Mod: PBBFAC,,, | Performed by: DIETITIAN, REGISTERED

## 2018-07-23 NOTE — PROGRESS NOTES
NUTRITION NOTE     Referring Physician: Austin Sotomayor M.D.  Reason for MNT Referral: 6 month Medically Supervised Diet pending Gastric Sleeve     Patient presents for 3rd visit for MSD with 6lbs wt loss this month, total 31 lbs weight loss over the past 3 months, by making numerous dietary and lifestyle changes. Pt reports this month was good, no struggles and using Grace TV dinner, reports not really hungry this month. Great support from her . Tried Premier and tolerating this month, no GI issues.           Past Medical History:   Diagnosis Date    BMI 60.0-69.9, adult      Hypertension      Obesity      Thyroid disease           CLINICAL DATA:  57 y.o. female.     Current Weight: 331 lbs  Weight Change Since Initial Visit: - 31 lbs  BMI: 60.65 (from 66.3)     CURRENT DIET:  Reduced-calorie diet.  Diet Recall: Food records are present.     Works 9pm-7am. 1 week off, 1 week on.     Week off work diet recall:     B: scrambled eggs or protein shake or banana, yogurt, protein shake  Sn: pistachios or fruit or cheese stick  L: chicken breast, carrots or steak with salad or fruit or red beans with sausage or pork chops with green beans  Sn: yogurt or fruit or baby carrots  D: same as lunch     Week on work diet recall:     B: Atkins frozen dinner or salad with 2 boiled eggs  S: yogurt or halos or grapes or none  L: eggs, leung or omelete or chicken with salad   S: fruit or nuts or none  D: 2 boiled eggs or red beans or protein shake     Diet Includes:   Meal Pattern: Improved. 3 meals + 1-3 snacks  Protein Supplements: Premier shakes 2-5 x per week  Snacking: Adequate. Snacks include healthy choices. Nuts, greek yogurt, cheese sticks, rolled deli turkey strawberries/grapes/halo oranges  Vegetables: Likes a few. Eats daily.  Fruits: Likes a variety. Eats daily.  Beverages: water and diet tea with Stevia  Dining Out: Weekly. Mostly fast food, restaurants and take-out. This month decreased.  Cooking at  home: Daily. Mostly baked, grilled meat and vegetables.     CURRENT EXERCISE:  Fair. Stationary bike a few times/week. Only on week's off.  Restrictions to exercise: knee pain     Vitamins / Minerals / Herbs:   MV and vit D     Food Allergies:   None known  Mild lactose intolerance     Social:  Works day and/or night shifts. [employer]  Lives with her .  Grocery shopping and food prep done by both.  Patient believes the household will be supportive after surgery.  Alcohol: None.  Smoking: None.     ASSESSMENT:  Patient demonstrates some willingness to change lifestyle habits as evidenced by weight loss, daily food logs, dietary changes and including protein drinks.     Doing fairly well with working on greatest challenges (lack of vegetables, convenience meals).     Needs 3 additional visits with RD for MSD.     PLAN:     Diet: Adjust diet plan.  - Continue with low carb choices daily  - continue to increase variety vegetables - no corn and 1-2 fruit/day  - try lactose free protein shakes - encouraged pt to try now since will have to drink 4/day before and after surgery (also may try drinking 2 premier shakes/day and monitor tolerance)  - work on consuming snacks between meals consistently     Exercise: as tolerate     Behavior Modification: Continue to document food & activity logs daily.     Weight loss prior to surgery: 35 lbs for BMI under 60     Return to clinic in one month.     SESSION TIME:  30 minutes

## 2018-07-23 NOTE — PATIENT INSTRUCTIONS
Goals:  - Continue with low carb choices daily  - continue to increase variety vegetables - no corn and 1-2 fruit/day  - try lactose free protein shakes, list provided     Exercise: as tolerate     Behavior Modification: Continue to document food & activity logs daily.

## 2018-08-23 ENCOUNTER — CLINICAL SUPPORT (OUTPATIENT)
Dept: BARIATRICS | Facility: CLINIC | Age: 58
End: 2018-08-23
Payer: COMMERCIAL

## 2018-08-23 VITALS — WEIGHT: 293 LBS | BODY MASS INDEX: 59.48 KG/M2

## 2018-08-23 DIAGNOSIS — Z71.3 DIETARY COUNSELING: ICD-10-CM

## 2018-08-23 DIAGNOSIS — E66.01 MORBID OBESITY WITH BMI OF 50.0-59.9, ADULT: ICD-10-CM

## 2018-08-23 DIAGNOSIS — I10 ESSENTIAL HYPERTENSION: ICD-10-CM

## 2018-08-23 PROCEDURE — 99499 UNLISTED E&M SERVICE: CPT | Mod: S$GLB,,, | Performed by: DIETITIAN, REGISTERED

## 2018-08-23 PROCEDURE — 99999 PR PBB SHADOW E&M-EST. PATIENT-LVL II: CPT | Mod: PBBFAC,,, | Performed by: DIETITIAN, REGISTERED

## 2018-08-23 PROCEDURE — 97803 MED NUTRITION INDIV SUBSEQ: CPT | Mod: S$GLB,,, | Performed by: DIETITIAN, REGISTERED

## 2018-08-23 NOTE — PATIENT INSTRUCTIONS
PLAN:     Diet: Maintain excellent diet plan.  - 5-6 small protein meals/snacks per day  - May increase to 2 Premier shakes per day  - Continue with low carb choices daily  - continue to increase variety vegetables        Exercise: Continue or increase as tolerated     Behavior Modification: Continue to document food & activity logs daily.     Weight loss prior to surgery: 35 lbs for BMI under 60     Return to clinic in one month.

## 2018-08-23 NOTE — PROGRESS NOTES
NUTRITION NOTE     Referring Physician: Austin Sotomayor M.D.  Reason for MNT Referral: 6 month Medically Supervised Diet pending Gastric Sleeve     Patient presents for 4th visit for MSD with 6lbs wt loss this month, total 37 lbs weight loss over the past 4 months, by making numerous dietary and lifestyle changes. Pt reports this month was good, and she even did well on vacation. Great support from her .              Past Medical History:   Diagnosis Date    BMI 60.0-69.9, adult      Hypertension      Obesity      Thyroid disease           CLINICAL DATA:  57 y.o. female.     Current Weight: 325 lbs  Weight Change Since Initial Visit: - 37 lbs  BMI: 59 (from 66.3)     CURRENT DIET:  Reduced-calorie diet. Low carb.  Diet Recall: Food records are present.     Works 9pm-7am. 1 week off, 1 week on.     Vacation diet recall:     B: protein shake  Sn: cheese stick  L: steak and side salad   D: BBQ ribs and baked beans OR small cheeseburger on bun     Week ON work diet recall:     B: Atkins frozen dinner   S: yogurt or boiled eggs or fruit  L: Brings from home - Salad with tomato, cucumber, boiled eggs, cheese, and [place] dressing.   S: fruit or nuts or cheese stick  D: protein shake     Diet Includes:   Meal Pattern: Improved. 3 meals + 1-3 snacks  Protein Supplements: Premier shakes daily - Premier rtd  Snacking: Adequate. Snacks include healthy choices. Nuts, greek yogurt, cheese sticks, rolled deli turkey strawberries/grapes/halo oranges  Vegetables: Likes a few. Eats daily.  Fruits: Likes a variety. Eats daily.  Beverages: water and diet tea with Stevia  Dining Out: Weekly. Mostly fast food, restaurants and take-out.   Cooking at home: Daily. Mostly baked, grilled meat, and vegetables.     CURRENT EXERCISE:  Fair. Stationary bike for 5 min, or until knee starts hurting. Only on week's off, twice/day.  Restrictions to exercise: knee pain     Vitamins / Minerals / Herbs:   MV and vit D     Food  Allergies:   None known  Mild lactose intolerance     Social:  Works day and/or night shifts. [employer]  Lives with her .  Grocery shopping and food prep done by both.  Patient believes the household will be supportive after surgery.  Alcohol: None.  Smoking: None.     ASSESSMENT:  Patient demonstrates willingness to change lifestyle habits as evidenced by 37 lbs weight loss, daily food logs, dietary changes and including protein drinks.     Doing well with working on greatest challenges (lack of vegetables, convenience meals).     Needs 2 additional visits with RD for MSD.     PLAN:     Diet: Maintain excellent diet plan.  - 5-6 small protein meals/snacks per day  - May increase to 2 Premier shakes per day  - Continue with low carb choices daily  - continue to increase variety vegetables        Exercise: Continue or increase as tolerated     Behavior Modification: Continue to document food & activity logs daily.     Weight loss prior to surgery: 35 lbs for BMI under 60     Return to clinic in one month.     SESSION TIME:  30 minutes

## 2018-09-19 ENCOUNTER — DOCUMENTATION ONLY (OUTPATIENT)
Dept: BARIATRICS | Facility: CLINIC | Age: 58
End: 2018-09-19

## 2018-09-19 ENCOUNTER — OFFICE VISIT (OUTPATIENT)
Dept: SLEEP MEDICINE | Facility: CLINIC | Age: 58
End: 2018-09-19
Payer: COMMERCIAL

## 2018-09-19 VITALS
DIASTOLIC BLOOD PRESSURE: 80 MMHG | SYSTOLIC BLOOD PRESSURE: 150 MMHG | WEIGHT: 293 LBS | HEART RATE: 64 BPM | HEIGHT: 62 IN | BODY MASS INDEX: 53.92 KG/M2

## 2018-09-19 DIAGNOSIS — G47.30 SLEEP APNEA, UNSPECIFIED TYPE: Primary | ICD-10-CM

## 2018-09-19 DIAGNOSIS — E66.01 OBESITY, MORBID, BMI 50 OR HIGHER: ICD-10-CM

## 2018-09-19 PROCEDURE — 99203 OFFICE O/P NEW LOW 30 MIN: CPT | Mod: S$GLB,,, | Performed by: NURSE PRACTITIONER

## 2018-09-19 PROCEDURE — 3008F BODY MASS INDEX DOCD: CPT | Mod: CPTII,S$GLB,, | Performed by: NURSE PRACTITIONER

## 2018-09-19 PROCEDURE — 3079F DIAST BP 80-89 MM HG: CPT | Mod: CPTII,S$GLB,, | Performed by: NURSE PRACTITIONER

## 2018-09-19 PROCEDURE — 3077F SYST BP >= 140 MM HG: CPT | Mod: CPTII,S$GLB,, | Performed by: NURSE PRACTITIONER

## 2018-09-19 PROCEDURE — 99999 PR PBB SHADOW E&M-EST. PATIENT-LVL III: CPT | Mod: PBBFAC,,, | Performed by: NURSE PRACTITIONER

## 2018-09-19 NOTE — LETTER
September 19, 2018      DIVYA Richard  [address]  [facility] [address]           [facility]  [address]  [facility] [address]  Phone: [phone]          Patient: Celena Ford   MR Number: [id number]   YOB: 1960   Date of Visit: 9/19/2018       Dear Miriam Ford:    Thank you for referring Celena Ford to me for evaluation. Attached you will find relevant portions of my assessment and plan of care.    If you have questions, please do not hesitate to call me. I look forward to following Celena Ford along with you.    Sincerely,    Aide George, DEVON    Enclosure  CC:  No Recipients    If you would like to receive this communication electronically, please contact [email or web address] or [phone] to request more information on [employer] access.    For providers and/or their staff who would like to refer a patient to [facility], please contact us through our one-stop-shop provider referral line, [facility] , at [phone].    If you feel you have received this communication in error or would no longer like to receive these types of communications, please e-mail [email or web address]

## 2018-09-19 NOTE — PROGRESS NOTES
Celena Ford  was seen as a new patient at the request of Miriam Ford FNP for the evaluation of obstructive sleep apnea.    CHIEF COMPLAINT:    Chief Complaint   Patient presents with    Sleep Apnea       09/19/2018 JUDY George NP: Initial HISTORY OF PRESENT ILLNESS: Celena Ford is a 58 y.o. female is here for sleep evaluation.       Sleep apnea eval in context of bariatric surgery work up    Patient complaints include: snoring per  and nocturia up to 3 x.     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   Reports difficult maintaining sleep when sleeping at night time during off day, usually wakes up at 2:30 am     Takes OTC Advil PM to induce sleep during work week. Now transitioning to Tylenol PM instead due to potential bariatric surgery; tylenol helps arthritic pain in knees as well     Denies symptoms of restless legs or kicking during sleep.    Occupation:  [employer]     EPWORTH SLEEPINESS SCALE 9/19/2018   Sitting and reading 1   Watching TV 1   Sitting, inactive in a public place (e.g. a theatre or a meeting) 0   As a passenger in a car for an hour without a break 0   Lying down to rest in the afternoon when circumstances permit 2   Sitting and talking to someone 0   Sitting quietly after a lunch without alcohol 1   In a car, while stopped for a few minutes in traffic 0   Total score 5       SLEEP ROUTINE:    Bed partner:     Sleep Clinic New Patient 9/19/2018   What time do you go to bed on a week day? (Give a range) Works nights, on one week off one week. On a work week goes to bed ~ 10 to 11 am   What time do you go to bed on a day off? (Give a range) On an off week ~ 8 pm   How long does it take you to fall asleep? (Give a range) 10 to 15 minutes   On average, how many times per night do you wake up? 2 to 3   How long does it take you to fall back into sleep?  (Give a range) Immediately   What time do you wake up to start your day on a week day? (Give a range) On a work week, 7 pm   What time do you wake up to start your day on a day off? (Give a range) On a week off 2 am   What time do you get out of bed? (Give a range) Immediatley upon waking   On average, how many hours do you sleep? 6   On average, how many naps do you take per day? 1 nap when off work   Rate your sleep quality from 0 to 5 (0-poor, 5-great). 4   Have you experienced:  Weight loss?   How much weight have you lost or gained (in lbs.) in the last year? 40   On average, how many times per night do you go to the bathroom?  2 to 3   Have you ever had a sleep study/CPAP machine/surgery for sleep apnea? No   Have you ever had a CPAP machine for sleep apnea? No   Have you ever had surgery for sleep apnea? No         PAST MEDICAL HISTORY:    Active Ambulatory Problems     Diagnosis Date Noted    Hypertension     BMI 60.0-69.9, adult     Obesity     Hypothyroid 2018    Arthritis 2018     Resolved Ambulatory Problems     Diagnosis Date Noted    No Resolved Ambulatory Problems     Past Medical History:   Diagnosis Date    BMI 60.0-69.9, adult     Hypertension     Obesity     Thyroid disease                 PAST SURGICAL HISTORY:    Past Surgical History:   Procedure Laterality Date     SECTION      x3    CHOLECYSTECTOMY      HYSTERECTOMY      KNEE ARTHROSCOPY W/ MENISCAL REPAIR Left     TOTAL KNEE ARTHROPLASTY Right     TOTAL WRIST ARTHROPLASTY Left          FAMILY HISTORY:                Family History   Problem Relation Age of Onset    Emphysema Mother     Heart attack Father        SOCIAL HISTORY:          Tobacco:   Social History     Tobacco Use   Smoking Status Never Smoker   Smokeless Tobacco Never Used       Alcohol use:    Social History     Substance and Sexual Activity   Alcohol Use No                 ALLERGIES:  Review of patient's allergies indicates:  No Known  "Allergies    CURRENT MEDICATIONS:    Current Outpatient Medications   Medication Sig Dispense Refill    aspirin (ECOTRIN) 81 MG EC tablet Take 81 mg by mouth once daily.      diphenhydramine-acetaminophen (TYLENOL PM)  mg Tab Take 1 tablet by mouth nightly as needed.      ibuprofen (ADVIL,MOTRIN) 200 MG tablet Take 400 mg by mouth every 6 (six) hours as needed for Pain.      levothyroxine (SYNTHROID) 125 MCG tablet TK 1 T PO D  3    triamterene-hydrochlorothiazide 37.5-25 mg (DYAZIDE) 37.5-25 mg per capsule TK 1 C PO D  3    ergocalciferol (ERGOCALCIFEROL) 50,000 unit Cap Take 1 capsule (50,000 Units total) by mouth twice a week. 24 capsule 0     No current facility-administered medications for this visit.                   REVIEW OF SYSTEMS:     Sleep related symptoms as per HPI.  Sleep Clinic ROS  9/19/2018   Difficulty breathing through the nose?  No   Sore throat or dry mouth in the morning? Sometimes   Irregular or very fast heart beat?  Sometimes   Shortness of breath?  No   Acid reflux? No   Body aches and pains?  Yes   Morning headaches? No   Dizziness? No   Mood changes?  No   Do you exercise?  No   Do you feel like moving your legs a lot?  Sometimes       Otherwise, a balance of systems reviewed is negative.          PHYSICAL EXAM:  Vitals:    09/19/18 0823   BP: (!) 150/80   Pulse: 64   Weight: (!) 145.3 kg (320 lb 5.3 oz)   Height: 5' 2" (1.575 m)   PainSc: 0-No pain     Body mass index is 58.59 kg/m².     GENERAL: Obese development, well groomed  HEENT:  Conjunctivae are non-erythematous; Pupils equal, round, and reactive to light; Nose is symmetrical; Nasal mucosa is pink and moist; Septum is midline; Inferior turbinates are normal; Nasal airflow is normal; Posterior pharynx is pink; Modified Mallampati: II; Posterior palate is normal; Tonsils +1; Uvula is normal and pink;Tongue is normal; Dentition is fair; No TMJ tenderness; Jaw opening and protrusion without click and without " discomfort.  NECK: Supple. Neck circumference is 16 inches. No thyromegaly. No palpable nodes.    SKIN: On face and neck: No abrasions, no rashes, no lesions.  No subcutaneous nodules are palpable.  RESPIRATORY: Chest is clear to auscultation.  Normal chest expansion and non-labored breathing at rest.  CARDIOVASCULAR: Normal S1, S2.  No murmurs, gallops or rubs. No carotid bruits bilaterally.  EXTREMITIES: No edema. No clubbing. No cyanosis. Station normal. Gait normal.        NEURO/PSYCH: Oriented to time, place and person. Normal attention span and concentration. Affect is full. Mood is normal.                                              ASSESSMENT:    Sleep apnea, unspecified. The patient symptomatically has snoring and nocturia with findings of crowded oral airway and elevated body mass index. Medical co-morbidities: HTN and obesity.  This warrants further investigation for possible obstructive sleep apnea.      Obesity, BMI > 50, discussed relationship between MATHEUS and weight; pt undergoing work up for sleeve gastrectomy w/ Bariatrics Dept; so far 40 lb weight loss by diet modification     PLAN:    Diagnostic: HST ([facility]). The nature of this procedure and its indication was discussed with the patient. Discussed with patient that if HST is negative or depending on severity of positive HST, in-lab sleep study may be necessary. Patient will be contacted after sleep study is done.   Email results, RTC prn @ [facility]     Long term goal of continued weight loss and retesting for sleep apnea at new weight plateau.    Education: During our discussion today, we talked about the etiology of obstructive sleep apnea as well as the potential ramifications of untreated sleep apnea, which could include daytime sleepiness, hypertension, heart disease and/or stroke. We discussed potential treatment options, which could include weight loss, body positioning, continuous positive airway pressure (CPAP),  OA, EPAP, or referral for surgical consideration.     Precautions: The patient was advised to abstain from driving should they feel sleepy  or drowsy.     Thank you for allowing me the opportunity to participate in the care of your patient.

## 2018-09-20 ENCOUNTER — OFFICE VISIT (OUTPATIENT)
Dept: PSYCHIATRY | Facility: CLINIC | Age: 58
End: 2018-09-20
Payer: COMMERCIAL

## 2018-09-20 ENCOUNTER — CLINICAL SUPPORT (OUTPATIENT)
Dept: BARIATRICS | Facility: CLINIC | Age: 58
End: 2018-09-20
Payer: COMMERCIAL

## 2018-09-20 VITALS — WEIGHT: 293 LBS | BODY MASS INDEX: 53.92 KG/M2 | HEIGHT: 62 IN

## 2018-09-20 DIAGNOSIS — M19.90 ARTHRITIS: ICD-10-CM

## 2018-09-20 DIAGNOSIS — I10 ESSENTIAL HYPERTENSION: ICD-10-CM

## 2018-09-20 DIAGNOSIS — E03.9 HYPOTHYROIDISM, UNSPECIFIED TYPE: ICD-10-CM

## 2018-09-20 DIAGNOSIS — E66.01 MORBID OBESITY DUE TO EXCESS CALORIES: ICD-10-CM

## 2018-09-20 DIAGNOSIS — Z71.3 DIETARY COUNSELING: ICD-10-CM

## 2018-09-20 DIAGNOSIS — Z01.818 PREOP EXAMINATION: Primary | ICD-10-CM

## 2018-09-20 PROCEDURE — 99999 PR PBB SHADOW E&M-EST. PATIENT-LVL II: CPT | Mod: PBBFAC,,, | Performed by: DIETITIAN, REGISTERED

## 2018-09-20 PROCEDURE — 90791 PSYCH DIAGNOSTIC EVALUATION: CPT | Mod: S$GLB,,, | Performed by: PSYCHOLOGIST

## 2018-09-20 PROCEDURE — 96102 PR PSYCHOLOGIC TESTING BY TECHNICIAN: CPT | Mod: 59,S$GLB,, | Performed by: PSYCHOLOGIST

## 2018-09-20 PROCEDURE — 96101 PR PSYCHOLOGIC TESTING BY PSYCH/PHYS: CPT | Mod: S$GLB,,, | Performed by: PSYCHOLOGIST

## 2018-09-20 PROCEDURE — 99499 UNLISTED E&M SERVICE: CPT | Mod: S$GLB,,, | Performed by: DIETITIAN, REGISTERED

## 2018-09-20 PROCEDURE — 99999 PR PBB SHADOW E&M-EST. PATIENT-LVL I: CPT | Mod: PBBFAC,,, | Performed by: PSYCHOLOGIST

## 2018-09-20 NOTE — Clinical Note
There are no overt psychological contraindications for bariatric surgery, pending nutritional clearance. Please do not hesitate to contact me with any questions or concerns. JR Blair

## 2018-09-20 NOTE — PATIENT INSTRUCTIONS
Maintain excellent diet plan.  - 5-6 small protein meals/snacks per day  - Continue with low carb choices daily  - continue to increase variety vegetables   - May try either protein powder or protein water     Exercise: Continue or increase as tolerated     Behavior Modification: Continue to document food & activity logs daily.

## 2018-09-20 NOTE — PSYCH TESTING
[facility] [address] [place]  [address]  [phone]    REPORT OF PSYCHOLOGICAL TESTING    NAME: Celena Ford  OC #: [id number]  : 1960    REFERRED BY: Austin Sotomayor M.D.    EVALUATED BY:  Eden Pa, Ph.D., Clinical Psychologist  AMY Walker Psychometrician    DATES OF EVALUATION: 2018, 2018    EVALUATION PROCEDURES AND TIMES:  Conducted by Psychologist:  Interpretation and report of test data  Integration of information from diagnostic interview, medical record, and testing data  Conducted by Technician:  [place] Multiphasic Personality Inventory - 2 - Restructured Form (MMPI-2-RF)  [facility] Diagnostic (MBMD)  CPT Codes and Time:  98007 - 2 hours; 10947 - 2 hours    EVALUATION FINDINGS:  Before this evaluation was initiated, the purposes and process of the assessment and the limits of confidentiality were discussed with the patient who expressed understanding of these issues and orally consented to proceed with the evaluation.    Ms. Celena Ford is a 58-year-old White  female who is pursuing bariatric surgery to improve her health and quality of life.  She denied past psychiatric history and treatment.  She denied any history of suicidality or non-suicidal self-injury.  She does not report current psychiatric problems, adjustment issues, or eating disorder symptoms.      Ms. Ford has struggled with weight since childhood.  Factors that have contributed to her weight gain over the years include:  country food, fried chicken, gravy, mocha frappe, and carbohydrates.  Ms. Ford does not consider herself an emotional eater. She has tried many weight loss methods on her own (i.e., grapefruit diet, exercise, portion control) with some success (40 lb. weight loss with grapefruit diet and exercise), and believes that her biggest weight loss challenge is I couldnt get myself to cut back  on the carbs.  Her motivation for seeking surgery now is to improve health.  Her postsurgical goals include being an active grandparent, going grocery shopping without struggle, and doing activities ([employer]).    At her initial consultation with DIVYA Richard, on 05/17/2018, her BMI was 66.37.  Her medical comorbidities include: Hypertension, unspecified type; Hypothyroidism, unspecified type; Arthritis; Suspected sleep apnea.  She completed a nutritional consultation with Cary Umaña RD, bariatric nutritionist, and reports that she has made many changes to her diet since beginning the program (with 37 lb. weight loss).  She has a good understanding regarding the risks and benefits of the procedure and appears motivated for change.  She was fully cooperative and engaged in the assessment process.     Ms. Ford produced a valid and interpretable MMPI-2-RF profile, answering items relevantly based on content. Therefore, her responses should be considered a relatively accurate reflection of her current psychological functioning. Her scores are all within the normal range, and indicate no current psychological symptoms or dysfunction.     The MBMD indicated that Ms. Ford is not reporting any significant psychiatric distress at this time.  She may be easily offended by minor events and has the potential to become irritable and unpredictable.  Serious illness may provoke resentment and annoyance toward healthcare providers, which should be handled directly and firmly by drawing on her strong desire to regain health.  If she is distressed about her medical condition it could lead to difficulties following prescribed medication or self-care regimens.  Patients with similar profiles may experience moderate complications in recovery if she is negatively influenced by her ability to follow a self-care or prescribed medication regimen, problems overeating, or lack of routine exercise.  Despite  her critical and analytic nature, she expects a successful treatment course.  Testing suggests she is quite capable of handling the psychological discomfort of various medical procedures.  Her responses suggest that, compared to other patients seeking bariatric surgery, she is likely to experience an improved quality of life after surgery, and she has an average likelihood of making behavioral changes that will lead to optimal surgery results.  Test data suggests Ms. Ford may benefit from post-surgery nutritional instruction plan and bariatric support group.  Of note, Ms. Ford acknowledges getting my feelings hurt, but I can turn around and walk away.  She can be easily irritated, but does not believe her emotions are unpredictable.  She reports, Matteo learned to control everything since my hysterectomy.  She admits feeling somewhat annoyed from her pain providers because their solutions are always to lose weight, but I think its more than that.  Ms. Ford also notes that she usually follows the doctors orders and take care of myself.    DIAGNOSTIC IMPRESSIONS:    ICD-10-CM ICD-9-CM   1. Preop examination Z01.818 V72.84   2. Morbid obesity due to excess calories E66.01 278.01   3. Essential hypertension I10 401.9   4. Hypothyroidism, unspecified type E03.9 244.9   5. Arthritis M19.90 716.90   6. BMI 60.0-69.9, adult Z68.44 V85.44       SUMMARY AND RECOMMENDATIONS:  Ms. Ford has a long history of weight problems and is pursuing bariatric surgery at this time in an effort to improve her health and quality of life.  Test results should be considered valid, and indicate that she reports no current psychiatric symptoms or adjustment problems.  She reports good social support, demonstrates several characteristics that make her a good candidate for surgery, and testing suggests that she will benefit in multiple ways from bariatric surgery.  Test results show NO overt psychological  contraindications for surgery, pending nutritional clearance.

## 2018-09-20 NOTE — PROGRESS NOTES
NUTRITION NOTE     Referring Physician: Austin Sotomayor M.D.  Reason for MNT Referral: 6 month Medically Supervised Diet pending Gastric Sleeve     Patient presents for 5th visit for MSD with 4lbs wt loss this month, total 41 lbs weight loss over the past 5 months, by making numerous dietary and lifestyle changes. Great support from her .              Past Medical History:   Diagnosis Date    BMI 60.0-69.9, adult      Hypertension      Obesity      Thyroid disease           CLINICAL DATA:  57 y.o. female.     Current Weight: 321 lbs  Weight Change Since Initial Visit: - 41 lbs  BMI: 58.75 (from 66.3)     CURRENT DIET:  Reduced-calorie diet. Low carb.  Diet Recall: Food records are present.     Works 9pm-7am. 1 week off, 1 week on.     Vacation diet recall:     B: protein shake  Sn: cheese stick or nuts   L: steak and side salad or hot dog with chili or chicken breast with cream of chicken soup  Snack: protein shake  D: BBQ ribs and baked beans OR small cheeseburger on bun or chicken with vegetables     Week ON work diet recall:     B: Atkins frozen dinner or leung with eggs  S: yogurt or boiled eggs or protein shake  L: protein shake  S: fruit or nuts or cheese stick  D: protein shake or chicken with vegetables or eggs with protein shake or steak with beans     Diet Includes:   Meal Pattern: Improved. 3 meals + 1-3 snacks  Protein Supplements: Premier shakes daily - Premier rtd - 2/day  Snacking: Adequate. Snacks include healthy choices. Nuts, greek yogurt, cheese sticks, rolled deli turkey strawberries/grapes/halo oranges  Vegetables: Likes a few. Eats daily.  Fruits: Likes a variety. Eats daily.  Beverages: water and diet tea with Stevia  Dining Out: Monthly (x2 this month). Mostly restaurants and take-out.   Cooking at home: Daily. Mostly baked, grilled meat, and vegetables.     CURRENT EXERCISE:  Fair. Stationary bike for 5 min, or until knee starts hurting. Only on week's off,  twice/day.  Restrictions to exercise: knee pain     Vitamins / Minerals / Herbs:   MV and vit D (just finished)     Food Allergies:   None known  Mild lactose intolerance     Social:  Works day and/or night shifts. [employer]  Lives with her .  Grocery shopping and food prep done by both.  Patient believes the household will be supportive after surgery.  Alcohol: None.  Smoking: None.     ASSESSMENT:  Patient demonstrates willingness to change lifestyle habits as evidenced by 41 lbs weight loss, daily food logs, dietary changes and including protein drinks.     Doing well with working on greatest challenges (lack of vegetables, convenience meals).     Needs 1 additional visits with RD for MSD.     PLAN:  Diet: Maintain excellent diet plan.  - 5-6 small protein meals/snacks per day  - Continue with low carb choices daily  - continue to increase variety vegetables   - May try either protein powder or protein water        Exercise: Continue or increase as tolerated     Behavior Modification: Continue to document food & activity logs daily.     Weight loss prior to surgery: 35 lbs for BMI under 60     Return to clinic in one month.     SESSION TIME:  30 minutes

## 2018-09-20 NOTE — PROGRESS NOTES
Psychiatry Initial Visit (PhD/LCSW)   Diagnostic Interview - CPT 64191     Date: 09/20/2018    Site: [facility]     Referral source: Austin Sotomayor M.D.    Clinical status of patient: Outpatient     Ms. Celena Ford, a 58-year-old White  female, presented for initial evaluation visit. Before this evaluation was initiated, the purposes and process of the assessment and the limits of confidentiality were discussed with the patient who expressed understanding of these issues and orally consented to proceed with the evaluation.     Chief complaint/reason for encounter: Routine psychological evaluation prior to bariatric surgery.     Type of surgery sought:  Sleeve    History of present illness:  Ms. Celena Ford is a 58-year-old White  female who is pursuing bariatric surgery to improve her health and quality of life.  She denied past psychiatric history and treatment.  She denied any history of suicidality or non-suicidal self-injury.  She does not report current psychiatric problems, adjustment issues, or eating disorder symptoms.  She has attempted making positive lifestyle changes in anticipation for surgery, with reported benefit.  The patient has a Body Mass Index of 66.37 as documented by the referring provider.    Ms. Ford has struggled with weight since childhood.  Factors that have contributed to her weight gain over the years include:  country food, fried chicken, gravy, mocha frappe, and carbohydrates.  Ms. Ford does not consider herself an emotional eater. She has tried many weight loss methods on her own (i.e., grapefruit diet, exercise, portion control) with some success (40 lb. weight loss with grapefruit diet and exercise), and believes that her biggest weight loss challenge is I couldnt get myself to cut back on the carbs.  Her motivation for seeking surgery now is to improve health.  Her postsurgical goals include being an  active grandparent, going grocery shopping without struggle, and doing activities ([employer]).    Ms. Ford has met with bariatric nutritionist Cary Umaña RD, and reports that she has made the following lifestyle changes since beginning the bariatric program:  cut back on carbohydrates, drinking protein shakes, preparing meals, bringing Atkins meals dinners to work, and baking instead of frying (with 37 lb. weight loss).  She must continue meeting with the bariatric nutritionist to demonstrate the implementation of lifestyle changes prior to clearance for bariatric surgery.  She chose the gastric sleeve because it sounds a little less scary.  She understood that she needs to focus on protein intake after surgery, which includes meats, eggs, nuts, cheese.  She noted that she will need to stay away from potatoes, corn, starches after surgery.  She hopes to lose 100 lb. and expects it will take ten months hopefully.  She plans to take two weeks to recover after surgery.  Her  and granddaughter (and all my kids will be there) will be available to help her during recovery.    Medical History:  Hypertension, unspecified type; Hypothyroidism, unspecified type; Arthritis; Suspected sleep apnea    Current medications and drug reactions:  see medication list.    Pain:  0/10    Psychiatric Symptoms:  Depression:  Denied.  She denied episodes of depressed mood or depression-related anhedonia.  Based on her reports, her symptoms do not meet full criteria for Major Depressive Disorder.  Cindy/Hypomania:  Denied.  She denied periods of elevated mood or abnormally increased energy or goal-directed activity.  Anxiety:  Denied.  She denied experiencing excessive, exaggerated anxiety that was unmanageable.  Based on her reports, her symptoms do not meet full criteria for Generalized Anxiety Disorder.  Thoughts:  Denied delusions, hallucinations.  Suicidal thoughts/behaviors:  Denied.  Self-injury:   Denied.  Sleep:  She reports that she does not sleep more than 2-3 hours at a time due to her overactive bladder.  She is able to fall back asleep quickly.  She feels refreshed after she sleeps.  Her  does not complain of her snoring, but he has sleep apnea.  Cognitive functioning:  Denied problems.    Current psychosocial stressors:  Just work.  I dont really have much stress.  Other than worrying about my oldest son having four kids to support, I try to support him.  Report of coping skills:  I just do it.  She enjoys watching television, playing with her grandchildren, and spending time with family.  Support system:  Usually my .  Strengths and liabilities:  Strength: Patient accepts guidance/feedback, Strength: Patient is expressive/articulate., Strength: Patient is motivated for change., Strength: Patient has positive support network., Strength: Patient has reasonable judgment., Strength: Patient is stable., Liability: Patient has poor health.    Current and past substance use:  Alcohol: Denied current use; denied history of abuse or dependency.   Drugs: Denied current use; denied history of abuse or dependency.  Tobacco: None.   Caffeine: She drinks one 32-oz. cup of tea each day.    Current Psychiatric Treatment:  Medications:  Denied.  Psychotherapy:  Denied.    Psychiatric History:  Previous diagnosis:  Denied.  Previous hospitalizations:  Denied.  History of outpatient treatment:  Denied.  Previous suicide attempt:  Denied.    Trauma history:  Denied.    History of eating disorders:  History of bulimia:  She denied recurrent episodes of binge eating and inappropriate compensatory behaviors.   History of binge-eating episodes:  She denied eating excessive amounts of food within a discrete time period with a lack of control during eating.      Family history of psychiatric illness:  None reported.    Social history (marriage, employment, etc.):  Ms. Ford was born in [place], [place]  and raised in [place] by her biological mother and father along with two brothers and two sisters, of whom she was the youngest.  She described her childhood as nice.  She denied childhood trauma, abuse, and neglect.  She did pretty good in school and dropped out in the 10th grade.  She did not earn her GED, but did not complete it because her mother passed away (in 1985 when she went back to earn it).  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  She is not on disability and finances are stable.  She has been  to her  ( [place]) for 37 years.  She has three sons (ages 34, 30, 28) and four biological grandchildren (and two step-grandchildren).  She currently lives with her .  Quaker is important to her, but she does not attend Hindu.  She identifies as Buddhism.    Legal history: Denied.    Mental Status Exam:   General appearance:  appears stated age, neatly dressed, well groomed  Speech:  normal rate and tone  Level of cooperation:  cooperative  Thought processes:  logical, goal-directed  Mood:  euthymic (its pretty good)  Thought content:  no illusions, no visual hallucinations, no auditory hallucinations, no delusions, no active or passive homicidal thoughts, no active or passive suicidal ideation, no obsessions, no compulsions, no violence  Affect:  appropriate  Orientation:  oriented to person, place, and date  Memory:  Recent memory:  1 of 3 objects after brief delay.  1/2 with prompts  Remote memory - intact  Attention span and concentration:  spelled HOUSE forward and backwards  Fund of general knowledge: 2 of 3 recent presidents  Abstract reasoning:    Similarities: abstract.    Proverbs: abstract.  Judgment and insight: fair  Language:  intact    Diagnostic Impression:    ICD-10-CM ICD-9-CM   1. Preop examination Z01.818 V72.84   2. Morbid obesity due to excess calories E66.01 278.01   3. Essential hypertension I10 401.9   4.  Hypothyroidism, unspecified type E03.9 244.9   5. Arthritis M19.90 716.90   6. BMI 60.0-69.9, adult Z68.44 V85.44       Summary/Conclusion:   There are no overt psychological contraindications for proceeding with bariatric surgery.  The patient has no significant psychiatric history, and reports no current psychiatric problems or major adjustment issues.  The patient has reasonable expectations for surgery, good social support, and has already begun making appropriate lifestyle changes in anticipation for surgery. The patient has verbalized appropriate awareness and commitment to the necessary behavioral changes associated with bariatric surgery and appears willing to comply with long-term lifestyle changes. There are no recommendations for psychological treatment at this time. The patient is aware of resources available should her needs change in the future.    Recommendations:  -This patient is psychologically cleared to proceed with bariatric surgery, pending nutritional clearance.    Please see Psychological Testing report available in Notes tab of Chart Review in Epic for results of psychological testing.      Length of Session:  35 minutes

## 2018-09-21 ENCOUNTER — TELEPHONE (OUTPATIENT)
Dept: SLEEP MEDICINE | Facility: HOSPITAL | Age: 58
End: 2018-09-21

## 2018-09-26 ENCOUNTER — TELEPHONE (OUTPATIENT)
Dept: SLEEP MEDICINE | Facility: HOSPITAL | Age: 58
End: 2018-09-26

## 2018-10-04 ENCOUNTER — HOSPITAL ENCOUNTER (OUTPATIENT)
Dept: SLEEP MEDICINE | Facility: HOSPITAL | Age: 58
Discharge: HOME | End: 2018-10-04
Attending: NURSE PRACTITIONER
Payer: COMMERCIAL

## 2018-10-04 DIAGNOSIS — G47.33 OSA (OBSTRUCTIVE SLEEP APNEA): ICD-10-CM

## 2018-10-04 DIAGNOSIS — G47.30 SLEEP APNEA, UNSPECIFIED TYPE: ICD-10-CM

## 2018-10-04 PROCEDURE — 95800 PR SLEEP STUDY, UNATTENDED, RECORD HEART RATE/O2 SAT/RESP ANAL/SLEEP TIME: ICD-10-PCS | Mod: 26,,, | Performed by: PSYCHIATRY & NEUROLOGY

## 2018-10-04 PROCEDURE — 95800 SLP STDY UNATTENDED: CPT | Mod: 26,,, | Performed by: PSYCHIATRY & NEUROLOGY

## 2018-10-04 PROCEDURE — 95800 SLP STDY UNATTENDED: CPT

## 2018-10-04 NOTE — PATIENT INSTRUCTIONS
Your sleep study will be scored and interpreted by one of our physicians who are board certified in sleep medicine.  Within two weeks the results will be sent to Lindsey Infante NP who referred you. Your physician should then contact you to go over the results, along with any recommendations. If you do not hear from your physician within two weeks, please call them.

## 2018-10-04 NOTE — PROGRESS NOTES
The patient ID was verified.  She was instructed on how to turn the Home Sleep Testing device on and off, how to apply the sensors.  She was encouraged to sleep on supine position and must have 6 hours of sleep. The patient was instructed not to get the device wet and return it to a  at the hospital. All questions were answered prior to patient leaving.  She was provided the  after visit summary.

## 2018-10-21 ENCOUNTER — TELEPHONE (OUTPATIENT)
Dept: SLEEP MEDICINE | Facility: CLINIC | Age: 58
End: 2018-10-21

## 2018-10-21 NOTE — TELEPHONE ENCOUNTER
Please notify pt that 10/04/2018 home sleep study consistent with significant obstructive sleep apnea and it is best to return to clinic to discuss results in detail and potential treatment options.     It is most convenient for patient to return to [facility] - please look into Sharon Mcfarland NP and 's schedules to accommodate pt

## 2018-10-22 NOTE — TELEPHONE ENCOUNTER
Called pt to schedule chad for sleep study results. She didn't answer so I left a message to call back.

## 2019-01-07 ENCOUNTER — DOCUMENTATION ONLY (OUTPATIENT)
Dept: BARIATRICS | Facility: CLINIC | Age: 59
End: 2019-01-07

## 2019-01-16 ENCOUNTER — PATIENT MESSAGE (OUTPATIENT)
Dept: ADMINISTRATIVE | Facility: OTHER | Age: 59
End: 2019-01-16

## 2019-04-26 ENCOUNTER — DOCUMENTATION ONLY (OUTPATIENT)
Dept: BARIATRICS | Facility: CLINIC | Age: 59
End: 2019-04-26